# Patient Record
Sex: FEMALE | Race: BLACK OR AFRICAN AMERICAN | Employment: FULL TIME | URBAN - METROPOLITAN AREA
[De-identification: names, ages, dates, MRNs, and addresses within clinical notes are randomized per-mention and may not be internally consistent; named-entity substitution may affect disease eponyms.]

---

## 2019-06-25 PROCEDURE — 87624 HPV HI-RISK TYP POOLED RSLT: CPT | Performed by: OBSTETRICS & GYNECOLOGY

## 2019-06-25 PROCEDURE — 88175 CYTOPATH C/V AUTO FLUID REDO: CPT | Performed by: OBSTETRICS & GYNECOLOGY

## 2019-07-20 ENCOUNTER — HOSPITAL ENCOUNTER (INPATIENT)
Facility: HOSPITAL | Age: 34
LOS: 2 days | Discharge: HOME | DRG: 202 | End: 2019-07-22
Attending: EMERGENCY MEDICINE | Admitting: INTERNAL MEDICINE
Payer: COMMERCIAL

## 2019-07-20 ENCOUNTER — APPOINTMENT (OUTPATIENT)
Dept: GENERAL RADIOLOGY | Age: 34
DRG: 202 | End: 2019-07-20
Attending: EMERGENCY MEDICINE
Payer: COMMERCIAL

## 2019-07-20 DIAGNOSIS — J45.52 SEVERE PERSISTENT ASTHMA WITH STATUS ASTHMATICUS: Primary | ICD-10-CM

## 2019-07-20 LAB
ALBUMIN SERPL-MCNC: 4 G/DL (ref 3.4–5)
ALBUMIN/GLOB SERPL: 1.3 {RATIO} (ref 1–2)
ALP LIVER SERPL-CCNC: 58 U/L (ref 37–98)
ALT SERPL-CCNC: 21 U/L (ref 13–56)
ANION GAP SERPL CALC-SCNC: 8 MMOL/L (ref 0–18)
AST SERPL-CCNC: 18 U/L (ref 15–37)
BASOPHILS # BLD AUTO: 0.16 X10(3) UL (ref 0–0.2)
BASOPHILS NFR BLD AUTO: 1 %
BILIRUB SERPL-MCNC: 0.6 MG/DL (ref 0.1–2)
BILIRUB UR QL STRIP.AUTO: NEGATIVE
BUN BLD-MCNC: 10 MG/DL (ref 7–18)
BUN/CREAT SERPL: 14.3 (ref 10–20)
CALCIUM BLD-MCNC: 9 MG/DL (ref 8.5–10.1)
CHLORIDE SERPL-SCNC: 107 MMOL/L (ref 98–112)
CLARITY UR REFRACT.AUTO: CLEAR
CO2 SERPL-SCNC: 25 MMOL/L (ref 21–32)
COLOR UR AUTO: YELLOW
CREAT BLD-MCNC: 0.7 MG/DL (ref 0.55–1.02)
DEPRECATED RDW RBC AUTO: 43.7 FL (ref 35.1–46.3)
EOSINOPHIL # BLD AUTO: 2.33 X10(3) UL (ref 0–0.7)
EOSINOPHIL NFR BLD AUTO: 14.5 %
ERYTHROCYTE [DISTWIDTH] IN BLOOD BY AUTOMATED COUNT: 13.2 % (ref 11–15)
GLOBULIN PLAS-MCNC: 3.2 G/DL (ref 2.8–4.4)
GLUCOSE BLD-MCNC: 101 MG/DL (ref 70–99)
GLUCOSE UR STRIP.AUTO-MCNC: NEGATIVE MG/DL
HCT VFR BLD AUTO: 43.5 % (ref 35–48)
HGB BLD-MCNC: 14 G/DL (ref 12–16)
IMM GRANULOCYTES # BLD AUTO: 0.06 X10(3) UL (ref 0–1)
IMM GRANULOCYTES NFR BLD: 0.4 %
KETONES UR STRIP.AUTO-MCNC: NEGATIVE MG/DL
LEUKOCYTE ESTERASE UR QL STRIP.AUTO: NEGATIVE
LYMPHOCYTES # BLD AUTO: 2.19 X10(3) UL (ref 1–4)
LYMPHOCYTES NFR BLD AUTO: 13.7 %
M PROTEIN MFR SERPL ELPH: 7.2 G/DL (ref 6.4–8.2)
MCH RBC QN AUTO: 28.9 PG (ref 26–34)
MCHC RBC AUTO-ENTMCNC: 32.2 G/DL (ref 31–37)
MCV RBC AUTO: 89.9 FL (ref 80–100)
MONOCYTES # BLD AUTO: 0.88 X10(3) UL (ref 0.1–1)
MONOCYTES NFR BLD AUTO: 5.5 %
NEUTROPHILS # BLD AUTO: 10.41 X10 (3) UL (ref 1.5–7.7)
NEUTROPHILS # BLD AUTO: 10.41 X10(3) UL (ref 1.5–7.7)
NEUTROPHILS NFR BLD AUTO: 64.9 %
NITRITE UR QL STRIP.AUTO: NEGATIVE
OSMOLALITY SERPL CALC.SUM OF ELEC: 289 MOSM/KG (ref 275–295)
PH UR STRIP.AUTO: 5 [PH] (ref 4.5–8)
PLATELET # BLD AUTO: 212 10(3)UL (ref 150–450)
POTASSIUM SERPL-SCNC: 3.7 MMOL/L (ref 3.5–5.1)
PROT UR STRIP.AUTO-MCNC: NEGATIVE MG/DL
RBC # BLD AUTO: 4.84 X10(6)UL (ref 3.8–5.3)
SODIUM SERPL-SCNC: 140 MMOL/L (ref 136–145)
SP GR UR STRIP.AUTO: 1.01 (ref 1–1.03)
UROBILINOGEN UR STRIP.AUTO-MCNC: <2 MG/DL
WBC # BLD AUTO: 16 X10(3) UL (ref 4–11)

## 2019-07-20 PROCEDURE — 80053 COMPREHEN METABOLIC PANEL: CPT | Performed by: EMERGENCY MEDICINE

## 2019-07-20 PROCEDURE — 94644 CONT INHLJ TX 1ST HOUR: CPT

## 2019-07-20 PROCEDURE — 99285 EMERGENCY DEPT VISIT HI MDM: CPT

## 2019-07-20 PROCEDURE — 71045 X-RAY EXAM CHEST 1 VIEW: CPT | Performed by: EMERGENCY MEDICINE

## 2019-07-20 PROCEDURE — 81001 URINALYSIS AUTO W/SCOPE: CPT | Performed by: EMERGENCY MEDICINE

## 2019-07-20 PROCEDURE — 94645 CONT INHLJ TX EACH ADDL HOUR: CPT

## 2019-07-20 PROCEDURE — 94640 AIRWAY INHALATION TREATMENT: CPT

## 2019-07-20 PROCEDURE — 81025 URINE PREGNANCY TEST: CPT

## 2019-07-20 PROCEDURE — 96361 HYDRATE IV INFUSION ADD-ON: CPT

## 2019-07-20 PROCEDURE — 85025 COMPLETE CBC W/AUTO DIFF WBC: CPT | Performed by: EMERGENCY MEDICINE

## 2019-07-20 PROCEDURE — 5A09357 ASSISTANCE WITH RESPIRATORY VENTILATION, LESS THAN 24 CONSECUTIVE HOURS, CONTINUOUS POSITIVE AIRWAY PRESSURE: ICD-10-PCS | Performed by: HOSPITALIST

## 2019-07-20 PROCEDURE — 87081 CULTURE SCREEN ONLY: CPT | Performed by: INTERNAL MEDICINE

## 2019-07-20 PROCEDURE — 96365 THER/PROPH/DIAG IV INF INIT: CPT

## 2019-07-20 PROCEDURE — 94002 VENT MGMT INPAT INIT DAY: CPT

## 2019-07-20 PROCEDURE — 96375 TX/PRO/DX INJ NEW DRUG ADDON: CPT

## 2019-07-20 RX ORDER — IPRATROPIUM BROMIDE AND ALBUTEROL SULFATE 2.5; .5 MG/3ML; MG/3ML
3 SOLUTION RESPIRATORY (INHALATION)
Status: DISCONTINUED | OUTPATIENT
Start: 2019-07-20 | End: 2019-07-20

## 2019-07-20 RX ORDER — ONDANSETRON 2 MG/ML
4 INJECTION INTRAMUSCULAR; INTRAVENOUS EVERY 6 HOURS PRN
Status: DISCONTINUED | OUTPATIENT
Start: 2019-07-20 | End: 2019-07-22

## 2019-07-20 RX ORDER — METHYLPREDNISOLONE SODIUM SUCCINATE 125 MG/2ML
125 INJECTION, POWDER, LYOPHILIZED, FOR SOLUTION INTRAMUSCULAR; INTRAVENOUS EVERY 8 HOURS
Status: DISCONTINUED | OUTPATIENT
Start: 2019-07-20 | End: 2019-07-21

## 2019-07-20 RX ORDER — ENOXAPARIN SODIUM 100 MG/ML
40 INJECTION SUBCUTANEOUS DAILY
Status: DISCONTINUED | OUTPATIENT
Start: 2019-07-20 | End: 2019-07-22

## 2019-07-20 RX ORDER — METOCLOPRAMIDE HYDROCHLORIDE 5 MG/ML
10 INJECTION INTRAMUSCULAR; INTRAVENOUS EVERY 8 HOURS PRN
Status: DISCONTINUED | OUTPATIENT
Start: 2019-07-20 | End: 2019-07-22

## 2019-07-20 RX ORDER — SODIUM CHLORIDE, SODIUM LACTATE, POTASSIUM CHLORIDE, CALCIUM CHLORIDE 600; 310; 30; 20 MG/100ML; MG/100ML; MG/100ML; MG/100ML
INJECTION, SOLUTION INTRAVENOUS CONTINUOUS
Status: DISCONTINUED | OUTPATIENT
Start: 2019-07-20 | End: 2019-07-21

## 2019-07-20 RX ORDER — ALBUTEROL SULFATE 2.5 MG/3ML
2.5 SOLUTION RESPIRATORY (INHALATION)
Status: DISCONTINUED | OUTPATIENT
Start: 2019-07-20 | End: 2019-07-21

## 2019-07-20 RX ORDER — METHYLPREDNISOLONE SODIUM SUCCINATE 125 MG/2ML
125 INJECTION, POWDER, LYOPHILIZED, FOR SOLUTION INTRAMUSCULAR; INTRAVENOUS ONCE
Status: COMPLETED | OUTPATIENT
Start: 2019-07-20 | End: 2019-07-20

## 2019-07-20 RX ORDER — POTASSIUM CHLORIDE 20 MEQ/1
40 TABLET, EXTENDED RELEASE ORAL ONCE
Status: COMPLETED | OUTPATIENT
Start: 2019-07-20 | End: 2019-07-20

## 2019-07-20 RX ORDER — IPRATROPIUM BROMIDE AND ALBUTEROL SULFATE 2.5; .5 MG/3ML; MG/3ML
SOLUTION RESPIRATORY (INHALATION)
Status: COMPLETED
Start: 2019-07-20 | End: 2019-07-20

## 2019-07-20 RX ORDER — BUDESONIDE 0.5 MG/2ML
0.5 INHALANT ORAL 2 TIMES DAILY
Status: DISCONTINUED | OUTPATIENT
Start: 2019-07-20 | End: 2019-07-22

## 2019-07-20 RX ORDER — MONTELUKAST SODIUM 10 MG/1
10 TABLET ORAL NIGHTLY
Status: DISCONTINUED | OUTPATIENT
Start: 2019-07-20 | End: 2019-07-22

## 2019-07-20 RX ORDER — CETIRIZINE HYDROCHLORIDE 10 MG/1
10 TABLET ORAL DAILY
Status: DISCONTINUED | OUTPATIENT
Start: 2019-07-20 | End: 2019-07-22

## 2019-07-20 RX ORDER — ACETAMINOPHEN 325 MG/1
650 TABLET ORAL EVERY 6 HOURS PRN
Status: DISCONTINUED | OUTPATIENT
Start: 2019-07-20 | End: 2019-07-22

## 2019-07-20 RX ORDER — MAGNESIUM SULFATE 1 G/100ML
1 INJECTION INTRAVENOUS ONCE
Status: COMPLETED | OUTPATIENT
Start: 2019-07-20 | End: 2019-07-20

## 2019-07-20 RX ORDER — ALBUTEROL SULFATE 2.5 MG/3ML
SOLUTION RESPIRATORY (INHALATION)
Status: DISCONTINUED
Start: 2019-07-20 | End: 2019-07-20 | Stop reason: WASHOUT

## 2019-07-20 RX ORDER — FEXOFENADINE HCL 180 MG/1
180 TABLET ORAL DAILY
COMMUNITY

## 2019-07-20 RX ORDER — IPRATROPIUM BROMIDE AND ALBUTEROL SULFATE 2.5; .5 MG/3ML; MG/3ML
3 SOLUTION RESPIRATORY (INHALATION) EVERY 4 HOURS
Status: DISCONTINUED | OUTPATIENT
Start: 2019-07-20 | End: 2019-07-20

## 2019-07-20 RX ORDER — METHYLPREDNISOLONE SODIUM SUCCINATE 40 MG/ML
80 INJECTION, POWDER, LYOPHILIZED, FOR SOLUTION INTRAMUSCULAR; INTRAVENOUS EVERY 8 HOURS
Status: DISCONTINUED | OUTPATIENT
Start: 2019-07-20 | End: 2019-07-20

## 2019-07-20 RX ORDER — IPRATROPIUM BROMIDE AND ALBUTEROL SULFATE 2.5; .5 MG/3ML; MG/3ML
3 SOLUTION RESPIRATORY (INHALATION) EVERY 4 HOURS PRN
Status: DISCONTINUED | OUTPATIENT
Start: 2019-07-20 | End: 2019-07-22

## 2019-07-20 RX ORDER — SODIUM CHLORIDE 9 MG/ML
INJECTION, SOLUTION INTRAVENOUS CONTINUOUS
Status: ACTIVE | OUTPATIENT
Start: 2019-07-20 | End: 2019-07-20

## 2019-07-20 RX ORDER — ONDANSETRON 2 MG/ML
4 INJECTION INTRAMUSCULAR; INTRAVENOUS EVERY 4 HOURS PRN
Status: DISCONTINUED | OUTPATIENT
Start: 2019-07-20 | End: 2019-07-20

## 2019-07-20 NOTE — ED NOTES
Pt presented to ER at 0200 for c/o worsening asthma symptoms, during downtime please see down time charting

## 2019-07-20 NOTE — CONSULTS
Critical Care H&P/Consult     NAME: Milli Portal - ROOM: 6[facility] - MRN: [id number] - Age: 30year old - :  1985    Date of Admission: 2019  3:33 AM  Admission Diagnosis: Severe persistent asthma with status asthmaticus [J45.52]      Assess indicated that her paternal grandmother is alive. She indicated that her paternal grandfather is . She indicated that her paternal aunt is alive. Allergies:No Known Allergies  No current outpatient medications on file.     [COMPLETED] sodium ch atraumatic. Moist oral mucosa   Neck: supple without mass   Lungs: minimal air movment with diffuse wheezing   Chest wall: No tenderness or deformity. Heart: Regular rate and rhythm, normal S1S2, no murmur.    Abdomen: soft, non-tender, non-distended, pos

## 2019-07-20 NOTE — RESPIRATORY THERAPY NOTE
Received pt on Bipap 12/6/35% with 5L Heliox bleeding in, pt's RR 20-24, sats  Around 95%, BS diminished and exp. Wheezing, pt appear no resp. Distress.  Called Dr. Johanna Tang and explain to him 5L Heliox bleeding into Bipap, Heliox will not work for pt, so D

## 2019-07-20 NOTE — H&P
.  CC: asthma exacerbation     PCP: No primary care provider on file. History of Present Illness: Patient is a 30year old female with PMH sig for asthma, typically on allegra and singulair and prn albuterol inhaler.  Has not been taking regular maintena NAD, appears stated age  [de-identified]- NCAT, anicteric sclera, MMM, OP clear  Lymph- no cervical LAD  CV- RRR no murmurs.  No MELISSA  Lungs- CTAB, good respiratory effort  Abd- soft, ntnd, no organomegaly, BS+  Derm- no rashes  MSK- good muscle strength and tone, no

## 2019-07-20 NOTE — ED PROVIDER NOTES
[facility] [place]    History   No chief complaint on file. Stated Complaint: BUZZ    HPI    Patient with asthma has been short of breath over the past 2 days, progressively worsening. No fever chills.   No sputu components:       Result Value    WBC 16.0 (*)     Neutrophil Absolute Prelim 10.41 (*)     Neutrophil Absolute 10.41 (*)     Eosinophil Absolute 2.33 (*)     All other components within normal limits   CBC WITH DIFFERENTIAL WITH PLATELET    Narrative:

## 2019-07-20 NOTE — PLAN OF CARE
Pt received this AM after admit from  ER. A&OX4. O2 89-93% on 4L NC, switched to heliox per RT, not able to maintain sats, placed on bipap w/heliox bleed in, WOB decreased on bipap, wheezing improving slowly.   Sinus tach on monitor, BP stable, afebrile

## 2019-07-20 NOTE — PROGRESS NOTES
07/20/19 1600   BiPAP   $ RT Standby Charge (per 15 min) 1   Device V60   BiPAP / CPAP CE# 6053   Mode Spontaneous/Timed   Interface Full face mask   Mask Size Small   Control Settings   Set Rate 12 breaths/min   Set IPAP 12   Set EPAP 6   Oxygen Percen

## 2019-07-21 LAB
ANION GAP SERPL CALC-SCNC: 6 MMOL/L (ref 0–18)
BASOPHILS # BLD AUTO: 0.03 X10(3) UL (ref 0–0.2)
BASOPHILS NFR BLD AUTO: 0.1 %
BUN BLD-MCNC: 11 MG/DL (ref 7–18)
BUN/CREAT SERPL: 17.7 (ref 10–20)
CALCIUM BLD-MCNC: 9.3 MG/DL (ref 8.5–10.1)
CHLORIDE SERPL-SCNC: 110 MMOL/L (ref 98–112)
CO2 SERPL-SCNC: 26 MMOL/L (ref 21–32)
CREAT BLD-MCNC: 0.62 MG/DL (ref 0.55–1.02)
DEPRECATED RDW RBC AUTO: 44.3 FL (ref 35.1–46.3)
EOSINOPHIL # BLD AUTO: 0.01 X10(3) UL (ref 0–0.7)
EOSINOPHIL NFR BLD AUTO: 0 %
ERYTHROCYTE [DISTWIDTH] IN BLOOD BY AUTOMATED COUNT: 13.4 % (ref 11–15)
GLUCOSE BLD-MCNC: 111 MG/DL (ref 70–99)
HCT VFR BLD AUTO: 39.1 % (ref 35–48)
HGB BLD-MCNC: 12.9 G/DL (ref 12–16)
IMM GRANULOCYTES # BLD AUTO: 0.24 X10(3) UL (ref 0–1)
IMM GRANULOCYTES NFR BLD: 1 %
LYMPHOCYTES # BLD AUTO: 2.34 X10(3) UL (ref 1–4)
LYMPHOCYTES NFR BLD AUTO: 9.5 %
MCH RBC QN AUTO: 29.9 PG (ref 26–34)
MCHC RBC AUTO-ENTMCNC: 33 G/DL (ref 31–37)
MCV RBC AUTO: 90.7 FL (ref 80–100)
MONOCYTES # BLD AUTO: 1.51 X10(3) UL (ref 0.1–1)
MONOCYTES NFR BLD AUTO: 6.1 %
NEUTROPHILS # BLD AUTO: 20.5 X10 (3) UL (ref 1.5–7.7)
NEUTROPHILS # BLD AUTO: 20.5 X10(3) UL (ref 1.5–7.7)
NEUTROPHILS NFR BLD AUTO: 83.3 %
OSMOLALITY SERPL CALC.SUM OF ELEC: 294 MOSM/KG (ref 275–295)
PLATELET # BLD AUTO: 218 10(3)UL (ref 150–450)
POTASSIUM SERPL-SCNC: 4.2 MMOL/L (ref 3.5–5.1)
RBC # BLD AUTO: 4.31 X10(6)UL (ref 3.8–5.3)
SODIUM SERPL-SCNC: 142 MMOL/L (ref 136–145)
WBC # BLD AUTO: 24.6 X10(3) UL (ref 4–11)

## 2019-07-21 PROCEDURE — 80048 BASIC METABOLIC PNL TOTAL CA: CPT | Performed by: INTERNAL MEDICINE

## 2019-07-21 PROCEDURE — 94640 AIRWAY INHALATION TREATMENT: CPT

## 2019-07-21 PROCEDURE — 85025 COMPLETE CBC W/AUTO DIFF WBC: CPT | Performed by: INTERNAL MEDICINE

## 2019-07-21 RX ORDER — METHYLPREDNISOLONE SODIUM SUCCINATE 125 MG/2ML
60 INJECTION, POWDER, LYOPHILIZED, FOR SOLUTION INTRAMUSCULAR; INTRAVENOUS EVERY 8 HOURS
Status: DISCONTINUED | OUTPATIENT
Start: 2019-07-21 | End: 2019-07-22

## 2019-07-21 RX ORDER — ALBUTEROL SULFATE 2.5 MG/3ML
2.5 SOLUTION RESPIRATORY (INHALATION)
Status: DISCONTINUED | OUTPATIENT
Start: 2019-07-21 | End: 2019-07-22

## 2019-07-21 NOTE — RESPIRATORY THERAPY NOTE
Received patient on bipap 12/6 35%. Around midnight, patient requested to have a break from bipap so 6L HFNC was applied and patient has been tolerating very well. Maintaining respiratory rate between 16-22, sats %. Patient currently on 4L HFNC.  Will

## 2019-07-21 NOTE — PROGRESS NOTES
Terrance Lyle Patient Status:  Inpatient    1985 MRN [id number]   [facility] 4SW-A Attending Garo Thakkar MD   Hosp Day # 1 PCP No primary care provider on file. Critical Care Progress Note      Assessment/Plan:  1.  Status ast edema.   Skin: No rashes or lesions.     Recent Labs   Lab 07/21/19  0413   RBC 4.31   HGB 12.9   HCT 39.1   MCV 90.7   MCH 29.9   MCHC 33.0   RDW 13.4   NEPRELIM 20.50*   WBC 24.6*   .0     Recent Labs   Lab 07/20/19  0416 07/21/19  0413   *

## 2019-07-21 NOTE — PLAN OF CARE
Pt received this AM, A&OX4. Breathing feeling better, able to tolerate 4L HFNC, nebs to Q4h. NSR/ST on monitor, BP stable, afebrile. Tolerating regular diet. Up to void in bathroom. IV SL.   Ok to transfer out of ICU this afternoon if she remains stabl

## 2019-07-21 NOTE — PLAN OF CARE
Received alert and oriented x4. On bipap tolerating settings. Switched to hfnc per her request for a break at midnight. Tolerating so far. Up to commode to void. Will continue to monitor.   Problem: CARDIOVASCULAR - ADULT  Goal: Maintains optimal cardiac ou retention  Outcome: Progressing

## 2019-07-22 VITALS
SYSTOLIC BLOOD PRESSURE: 109 MMHG | WEIGHT: 157.19 LBS | BODY MASS INDEX: 24.67 KG/M2 | DIASTOLIC BLOOD PRESSURE: 81 MMHG | HEART RATE: 101 BPM | HEIGHT: 67 IN | TEMPERATURE: 98 F | OXYGEN SATURATION: 97 % | RESPIRATION RATE: 18 BRPM

## 2019-07-22 LAB
ANION GAP SERPL CALC-SCNC: 5 MMOL/L (ref 0–18)
BUN BLD-MCNC: 15 MG/DL (ref 7–18)
BUN/CREAT SERPL: 24.6 (ref 10–20)
CALCIUM BLD-MCNC: 9 MG/DL (ref 8.5–10.1)
CHLORIDE SERPL-SCNC: 108 MMOL/L (ref 98–112)
CO2 SERPL-SCNC: 27 MMOL/L (ref 21–32)
CREAT BLD-MCNC: 0.61 MG/DL (ref 0.55–1.02)
DEPRECATED RDW RBC AUTO: 45.1 FL (ref 35.1–46.3)
ERYTHROCYTE [DISTWIDTH] IN BLOOD BY AUTOMATED COUNT: 13.3 % (ref 11–15)
GLUCOSE BLD-MCNC: 108 MG/DL (ref 70–99)
HCT VFR BLD AUTO: 39.4 % (ref 35–48)
HGB BLD-MCNC: 12.8 G/DL (ref 12–16)
MCH RBC QN AUTO: 29.6 PG (ref 26–34)
MCHC RBC AUTO-ENTMCNC: 32.5 G/DL (ref 31–37)
MCV RBC AUTO: 91 FL (ref 80–100)
OSMOLALITY SERPL CALC.SUM OF ELEC: 291 MOSM/KG (ref 275–295)
PLATELET # BLD AUTO: 218 10(3)UL (ref 150–450)
POTASSIUM SERPL-SCNC: 4.1 MMOL/L (ref 3.5–5.1)
RBC # BLD AUTO: 4.33 X10(6)UL (ref 3.8–5.3)
SODIUM SERPL-SCNC: 140 MMOL/L (ref 136–145)
WBC # BLD AUTO: 20.7 X10(3) UL (ref 4–11)

## 2019-07-22 PROCEDURE — 80048 BASIC METABOLIC PNL TOTAL CA: CPT | Performed by: NURSE PRACTITIONER

## 2019-07-22 PROCEDURE — 94640 AIRWAY INHALATION TREATMENT: CPT

## 2019-07-22 PROCEDURE — 85027 COMPLETE CBC AUTOMATED: CPT | Performed by: NURSE PRACTITIONER

## 2019-07-22 RX ORDER — PREDNISONE 10 MG/1
TABLET ORAL
Qty: 30 TABLET | Refills: 0 | Status: SHIPPED | OUTPATIENT
Start: 2019-07-22 | End: 2020-10-26 | Stop reason: ALTCHOICE

## 2019-07-22 RX ORDER — IPRATROPIUM BROMIDE AND ALBUTEROL SULFATE 2.5; .5 MG/3ML; MG/3ML
3 SOLUTION RESPIRATORY (INHALATION) EVERY 4 HOURS PRN
Qty: 120 ML | Refills: 0 | Status: SHIPPED | OUTPATIENT
Start: 2019-07-22

## 2019-07-22 RX ORDER — PREDNISONE 20 MG/1
40 TABLET ORAL
Status: DISCONTINUED | OUTPATIENT
Start: 2019-07-22 | End: 2019-07-22

## 2019-07-22 RX ORDER — ALBUTEROL SULFATE 2.5 MG/3ML
2.5 SOLUTION RESPIRATORY (INHALATION)
Status: DISCONTINUED | OUTPATIENT
Start: 2019-07-22 | End: 2019-07-22

## 2019-07-22 RX ORDER — BUDESONIDE 0.5 MG/2ML
0.5 INHALANT ORAL 2 TIMES DAILY
Qty: 120 ML | Refills: 0 | Status: SHIPPED | OUTPATIENT
Start: 2019-07-22

## 2019-07-22 NOTE — PAYOR COMM NOTE
--------------  ADMISSION REVIEW     Payor: Yoselin Gil #:  [id number]  Authorization Number: [id number]    Admit date: 7/20/19  Admit time: 4744       Admitting Physician: Desmond Hay MD  Attending Physician:  Carolin Simon MD  Primary Pulse 109   Resp 24   SpO2 96%         Physical Exam  Appears very short of breath. Tripoding. Able to speak in full sentences however. No stridor or drooling. Afebrile and nontoxic in appearance.   Skin: Warm and dry without cyanosis, pallor, rash  HE nebulizer running.     Admission disposition: 7/20/2019  4:39 AM                 Disposition and Plan     Clinical Impression:  Severe persistent asthma with status asthmaticus  (primary encounter diagnosis)    Disposition:  Admit  7/20/2019  4:39 am    Fol (PROAIR HFA) 108 (90 Base) MCG/ACT Inhalation Aero Soln Inhale 2 puffs into the lungs every 6 (six) hours as needed. Disp: 3 Inhaler Rfl: 1   Mometasone Furoate 100 MCG/ACT Inhalation Aerosol Inhale into the lungs.  Disp:  Rfl:    Montelukast Sodium 10 MG O breathing    FINDINGS: Cardiac silhouette and pulmonary vasculature are unremarkable. No consolidation, pleural effusion or pneumothorax. IMPRESSION: Unremarkable portable chest radiograph. Agree with preliminary radiology report from [address] [place] [address] radiology. methylPREDNISolone Sodium Succ (Solu-MEDROL) injection 60 mg     Date Action Dose Route User    7/22/2019 0401 Given 60 mg Intravenous Thang De La Fuente RN    7/21/2019 1842 Given 60 mg Intravenous Otf Bah RN      Montelukast Sodium (Candice Hart Moist oral mucosa                Neck: supple without mass                Lungs: minimal air movment with diffuse wheezing                Chest wall: No tenderness or deformity. Heart: Regular rate and rhythm, normal S1S2, no murmur. General: comfotabe                HEENT: lips, mucosa, tongue normal.                 Lungs: still wheezing but much better air movement                Chest wall: No tenderness or deformity.                 Heart: Regular rate and rhythm, normal S1 rashes or lesions  Neuro: A&OX3, no focal deficits     Data Review:       Labs:           Recent Labs   Lab 07/20/19 0416 07/21/19 0413   WBC 16.0* 24.6*   HGB 14.0 12.9   MCV 89.9 90.7   .0 218.0              Recent Labs   Lab 07/20/19 0416 07/2 sulfate  2.5 mg Nebulization 6 times per day   • methylPREDNISolone Sodium Succ  60 mg Intravenous Q8H   • enoxaparin  40 mg Subcutaneous Daily   • budesonide  0.5 mg Nebulization BID   • cetirizine  10 mg Oral Daily   • Montelukast Sodium  10 mg Oral Nigh

## 2019-07-22 NOTE — PROGRESS NOTES
NURSING TRANSFER NOTE      Patient RECEIVED FROM ICU VIA WHEELCHAIR. SHE IS A&OX4.  DENIES SOB/CHEST PAIN. SATURATING ABOVE 92% ON O2 AT 2L/HFNC.  +NP COUGH. +EXP WHEEZE. DENIES PAIN/DISCOMFORT. Oriented to room. Safety precautions initiated.

## 2019-07-22 NOTE — CM/SW NOTE
07/22/19 1400   CM/SW Screening   Referral Source    Information Source Chart review;Nursing rounds   Patient's Mental Status Alert;Oriented   Patient lives with Alone   Patient Status Prior to Admission   Independent with ADLs and Mobility

## 2019-07-22 NOTE — PROGRESS NOTES
Pulmonary Progress Note        NAME: Terrance Lyle - ROOM: 530530-A - MRN: [id number] - Age: 30year old - : 1985        Last 24hrs: No events overnight, feels better today and hopeful to go home    OBJECTIVE:   19  0404 19  0425  Range Status   07/20/2019 04:16 AM 4.0 3.4 - 5.0 g/dL Final         ASSESSMENT/PLAN:  1.  Status asthmaticus  -much improved  -start oral steroids today  -nebulized steroids  -continue monteleukast  -long term tx is - neb pulmicort and albuterol w/ monteluk

## 2019-07-22 NOTE — PLAN OF CARE
Assumed care of patient around 0730, alert and oriented X4, no c/o CP/SOB, SpO2 94 % on RA  Feeling better overall today  Walked the hallway O2 sats remained above 90% on room air-tolerated walk well    Per Dr. Maritza Ko ok to [place] home    Patient IV dis oxygenation  Description  INTERVENTIONS:  - Assess for changes in respiratory status  - Assess for changes in mentation and behavior  - Position to facilitate oxygenation and minimize respiratory effort  - Oxygen supplementation based on oxygen saturation

## 2019-07-22 NOTE — PLAN OF CARE
Alert and oriented x4. On 2l nc. Reports overall improved breathing. Toelrating nebs q4. Orders to transfer to pmu with continous pulse ox. Report called to [facility] Transferred by wheelchair with all belongings at [phone] in stable condition.   Problem: CAR anxiety  - Monitor for signs/symptoms of CO2 retention  Outcome: Progressing

## 2019-07-22 NOTE — PLAN OF CARE
Problem: Patient/Family Goals  Goal: Patient/Family Long Term Goal  Description  Patient's Long Term Goal:    Interventions:    - See additional Care Plan goals for specific interventions  Outcome: Progressing  Goal: Patient/Family Short Term Goal  Descr Incentive Spirometry  - Assess the need for suctioning and perform as needed  - Assess and instruct to report SOB or any respiratory difficulty  - Respiratory Therapy support as indicated  - Manage/alleviate anxiety  - Monitor for signs/symptoms of CO2 ret

## 2019-07-22 NOTE — RESPIRATORY THERAPY NOTE
Received pt on 2L NC. Her breath sound diminished/wheeze. Continue bronchodilator Neb Tx Q4, pulmicort BID. Peak flow pre/post 170/220. Bipap PRN. Will continue to monitor pt.

## 2019-07-23 NOTE — PAYOR COMM NOTE
--------------  DISCHARGE REVIEW    Payor: Carmenza Jay Jay #:  [id number]  Authorization Number: [id number]    Admit date: 7/20/19  Admit time:  1395  Discharge Date: 7/22/2019  4:24 PM     Admitting Physician: Tawnya Ennis MD  Attending Phys Please refer to prior H&P by Dr. Derick Hernandez on 7/20-- Patient is a 30year old female with PMH sig for asthma, typically on allegra and singulair and prn albuterol inhaler. Has not been taking regular maintenance inhaler. She had worsening sob over past 2 days. Commonly known as:  SINGULAIR  Take 1 tablet (10 mg total) by mouth nightly. * This list has 2 medication(s) that are the same as other medications prescribed for you.  Read the directions carefully, and ask your doctor or other care provider to rev

## 2019-07-23 NOTE — DISCHARGE SUMMARY
[facility] Discharge Summary    Patient ID:  Katerine Neal  [id number]  30year old  1/19/1985    Admit date: 7/20/2019  Discharge date and time: 7/22/19  Attending Physician: Ventura Guillen MD  Primary Care Physician: No primary care provider Neuro: CN Intact, no focal deficits    Discharge meds     Medication List      START taking these medications    budesonide 0.5 MG/2ML Susp  Commonly known as:  PULMICORT  Take 2 mL (0.5 mg total) by nebulization 2 (two) times daily.      ipratropium-albute PROCEDURE:  XR CHEST AP PORTABLE  (CPT=71045)  TECHNIQUE:  AP chest radiograph was obtained. COMPARISON:  None. INDICATIONS:  BUZZ  PATIENT STATED HISTORY: (As transcribed by Technologist)  Patient has a history of asthma.  She is having difficulty breathi

## 2022-03-27 NOTE — PROGRESS NOTES
Melanie  Hospitalist note    PCP: No primary care provider on file. Chief Complaint:  F/u asthma exacerbation    SUBJECTIVE:  Doing well. Sob improving. Was able to walk to bathroom. Eating.      OBJECTIVE:  Temp:  [97 °F (36.1 °C)-98 °F (36.7 °C)] 98 °F ( 1) severe asthma exacerbation with status asthmaticus  - icu/pulm consulted  - to transfer to floor likely  - cont nebs  - IV steroids  - cont singulair from home  - supposed to f/u with allergy as outpt- consider maintenance steroid inhaler and other Weakness

## 2025-03-11 ENCOUNTER — OFFICE VISIT (OUTPATIENT)
Facility: LOCATION | Age: 40
End: 2025-03-11
Payer: COMMERCIAL

## 2025-03-11 DIAGNOSIS — J31.0 CHRONIC RHINITIS: Primary | ICD-10-CM

## 2025-03-11 DIAGNOSIS — H93.13 TINNITUS OF BOTH EARS: ICD-10-CM

## 2025-03-11 PROCEDURE — 99204 OFFICE O/P NEW MOD 45 MIN: CPT | Performed by: OTOLARYNGOLOGY

## 2025-03-11 RX ORDER — FLUTICASONE PROPIONATE 50 MCG
2 SPRAY, SUSPENSION (ML) NASAL DAILY
Qty: 16 G | Refills: 3 | Status: SHIPPED | OUTPATIENT
Start: 2025-03-11

## 2025-03-11 RX ORDER — AZELASTINE 1 MG/ML
2 SPRAY, METERED NASAL 2 TIMES DAILY
Qty: 30 ML | Refills: 3 | Status: SHIPPED | OUTPATIENT
Start: 2025-03-11

## 2025-03-11 NOTE — PROGRESS NOTES
Otolaryngology Consultation Note     Reason for consultation: chronic rhinitis  Consulting physician and service: None     HPI: 39 y/o F presents with h/o chronic nasal congestion for the past several years. [facility]. Currently on Singulair/Allegra as well as an inhaler. Not on any nasal sprays but has used Flonase in the past. Symptoms occur year-round. Underwent allergy testing in the past, positive for a few allergens, considering immunotherapy but has not started yet. Positive clear rhinorrhea, scratchy throat and headaches. Recently notes worsening tinnitus in both ears, intermittent, non-pulsatile. Has h/o exposure to loud noise while in the , no recent audiogram. No hearing loss, otalgia, otorrhea or vertigo. No recent trauma or ear infection. No other complaints.     Past Medical History:   Past Medical History:    Asthma (HCC)        Past Surgical History:   Past Surgical History:   Procedure Laterality Date    Laparoscopy, surg, myomectomy; 5/> intramural myomas &/or total wt >250 gms  05/2021    Laser surgery of eye Bilateral 2013    Removal of ovarian cyst(s) Right 2001    Removal of ovarian cyst(s)  05/2021    Performed outside surgery center.        Medication: Scheduled Meds:  Continuous Infusions:  PRN Meds:.     Allergies:  Allergies[1]  Pertinent Family History:   Family History   Problem Relation Age of Onset    Breast Cancer Paternal Grandmother     Colon Cancer Paternal Grandmother         Pertinent Social History:   Social History     Socioeconomic History    Marital status: Single     Spouse name: Not on file    Number of children: Not on file    Years of education: Not on file    Highest education level: Not on file   Occupational History    Not on file   Tobacco Use    Smoking status: Never    Smokeless tobacco: Never   Vaping Use    Vaping status: Never Used   Substance and Sexual Activity    Alcohol use: Yes     Comment:  social    Drug use: Never    Sexual activity: Yes     Partners: Male   Other Topics Concern    Not on file   Social History Narrative    Not on file     Social Drivers of Health     Food Insecurity: Low Risk  (6/12/2023)    Received from [facility]    Food Insecurity     Have there been times that your food ran out, and you didn't have money to get more?: No     Are there times that you worry that this might happen?: No   Transportation Needs: Low Risk  (6/12/2023)    Received from [facility]    Transportation Needs     Do you have trouble getting transportation to medical appointments?: No     How do you normally get to and from your appointments?: Family/Friend   Stress: Not on file   Housing Stability: Not on file (6/12/2023)        Review of Systems:  Constitutional: Negative.  HENT: see above  Eyes: Negative.  Respiratory: Negative.  Cardiovascular: Negative.  Gastrointestinal: Negative.  Musculoskeletal: Negative.  Skin: Negative.  Renal: Negative  Endocrine: Negative  Psychiatric/Behavioral: Negative.     Physical Examination:  Vitals: not currently breastfeeding.     General: Breathing comfortably on room air while sitting up. Able to communicate verbally. Voice normal. Normal appearing body habitus.     Musculoskeletal: Head: Atraumatic and normocephalic.     Neck: Full ROM and able to extend without issues     Ears: External auditory canals clear with no evidence of significant cerumen or stenosis. Tympanic membranes visible with no evidence of retraction or perforation. No evidence of middle ear effusion bilaterally.     Nose: No sinus tenderness bilaterally upon palpation. No obvious nasal deformity. No masses, rhinorrhea, epistaxis. Nasal septum and mucosa appear normal. Nasal turbinate hypertrophied, R>L     Mouth/Throat: Salivary glands appear normal with no evidence of pain or mass. No masses or lesions noted within the oral mucosa, hard and soft palates,  tongue, tonsils and posterior pharynx. Able to tolerate secretions. Oral cavity and oropharynx widely patent. Tonsils 2 plus and symmetric. Posterior pharyngeal walls appear normal. Thyroid non tender to palpation without evidence of mass or nodules.     Eyes: Extraocular movements intact and pupils equally reactive to light stimulus. No spontaneous or gaze-evoked nystagmus. No proptosis or ecchymosis. VFI.     Lymphatic: No significant cervical lymphadenopathy noted.     Neuro: CN 7 intact with symmetric mobility and strength. No loss of facial sensation.     Skin: Dry, normal turgor, normal color.     Psych: Alert and oriented to person/place/time. Normal affect, amiable     Significant laboratory values: n/a     Imaging: n/a     Procedures: n/a     Assessment/Plan:     Chronic rhinitis: likely allergic. Restart Flonase daily. Recommend Astelin BID. Cont OTC antihistamine and Singulair daily. Cont follow up with Allergist for immunotherapy.  Bilateral tinnitus: recommend masking techniques. Will order audiogram.      Matt Rich MD         [1] No Known Allergies

## 2025-03-13 ENCOUNTER — MED REC SCAN ONLY (OUTPATIENT)
Facility: LOCATION | Age: 40
End: 2025-03-13

## 2025-04-14 ENCOUNTER — OFFICE VISIT (OUTPATIENT)
Facility: LOCATION | Age: 40
End: 2025-04-14
Payer: OTHER GOVERNMENT

## 2025-04-14 DIAGNOSIS — H93.13 TINNITUS OF BOTH EARS: ICD-10-CM

## 2025-04-14 DIAGNOSIS — H93.13 TINNITUS OF BOTH EARS: Primary | ICD-10-CM

## 2025-04-14 DIAGNOSIS — R42 VERTIGO: ICD-10-CM

## 2025-04-14 DIAGNOSIS — J31.0 CHRONIC RHINITIS: Primary | ICD-10-CM

## 2025-04-14 DIAGNOSIS — H93.293 ABNORMAL AUDITORY PERCEPTION OF BOTH EARS: ICD-10-CM

## 2025-04-14 PROCEDURE — 99214 OFFICE O/P EST MOD 30 MIN: CPT | Performed by: OTOLARYNGOLOGY

## 2025-04-14 NOTE — PROGRESS NOTES
Otolaryngology Consultation Note     HPI: 39 y/o F with chronic rhinitis, bilateral tinnitus. Here for follow up. Doing well. Notes improvement in nasal congestion. Taking Flonase daily and Astelin BID. Underwent audiogram today. Cont to have tinnitus, unchanged from last visit. No other new complaints since last visit.      Past Medical History: Past Medical History[1]     Past Surgical History: Past Surgical History[2]     Medication: Scheduled Meds:Scheduled Medications[3]  Continuous Infusions:Medication Infusions[4]  PRN Meds:.PRN Medications[5]     Allergies:  Allergies[6]  Pertinent Family History: Family History[7]     Pertinent Social History:   Social History     Socioeconomic History    Marital status: Single     Spouse name: Not on file    Number of children: Not on file    Years of education: Not on file    Highest education level: Not on file   Occupational History    Not on file   Tobacco Use    Smoking status: Never    Smokeless tobacco: Never   Vaping Use    Vaping status: Never Used   Substance and Sexual Activity    Alcohol use: Yes     Comment: social    Drug use: Never    Sexual activity: Yes     Partners: Male   Other Topics Concern    Not on file   Social History Narrative    Not on file     Social Drivers of Health     Food Insecurity: Low Risk  (6/12/2023)    Received from [facility]    Food Insecurity     Have there been times that your food ran out, and you didn't have money to get more?: No     Are there times that you worry that this might happen?: No   Transportation Needs: Low Risk  (6/12/2023)    Received from [facility]    Transportation Needs     Do you have trouble getting transportation to medical appointments?: No     How do you normally get to and from your appointments?: Family/Friend   Stress: Not on file   Housing Stability: Not on file (6/12/2023)        Review of Systems:  Constitutional: Negative.  HENT: see above  Eyes:  Negative.  Respiratory: Negative.  Cardiovascular: Negative.  Gastrointestinal: Negative.  Musculoskeletal: Negative.  Skin: Negative.  Renal: Negative  Endocrine: Negative  Psychiatric/Behavioral: Negative.     Physical Examination:  Vitals: not currently breastfeeding.     General: Breathing comfortably on room air while sitting up. Able to communicate verbally. Voice normal. Normal appearing body habitus.     Musculoskeletal: Head: Atraumatic and normocephalic.     Neck: Full ROM and able to extend without issues     Ears: External auditory canals clear with no evidence of significant cerumen or stenosis. Tympanic membranes visible with no evidence of retraction or perforation. No evidence of middle ear effusion bilaterally.     Nose: No sinus tenderness bilaterally upon palpation. No obvious nasal deformity. No masses, rhinorrhea, epistaxis. Nasal septum and mucosa appear normal. Nasal turbinate hypertrophied, R>L      Eyes: Extraocular movements intact and pupils equally reactive to light stimulus. No spontaneous or gaze-evoked nystagmus. No proptosis or ecchymosis. VFI.      Neuro: CN 7 intact with symmetric mobility and strength. No loss of facial sensation.     Skin: Dry, normal turgor, normal color.     Psych: Alert and oriented to person/place/time. Normal affect, amiable     Audiogram personally reviewed  My interpretation is as follows    Normal hearing bilaterally  Normal type A tympanograms bilaterally  % bilaterally     Assessment/Plan:      Chronic rhinitis: stable, improved, likely allergic. Cont Flonase daily and Astelin BID. Cont OTC antihistamine and Singulair daily. Cont follow up with Allergist for immunotherapy.  Bilateral tinnitus: stable, normal hearing on audiogram. Recommend masking techniques. Can try OTC Lipoflavonoids.        Matt Rich MD         [1]   Past Medical History:   Asthma (HCC)   [2]   Past Surgical History:  Procedure Laterality Date    Laparoscopy, surg,  myomectomy; 5/> intramural myomas &/or total wt >250 gms  05/2021    Laser surgery of eye Bilateral 2013    Removal of ovarian cyst(s) Right 2001    Removal of ovarian cyst(s)  05/2021    Performed outside surgery center.   [3] [4] [5] [6] No Known Allergies  [7]   Family History  Problem Relation Age of Onset    Breast Cancer Paternal Grandmother     Colon Cancer Paternal Grandmother

## 2025-04-14 NOTE — PROGRESS NOTES
Nir Poe was seen for an audiometric evaluation and tympanogram today. Referred back to physician.    Alina Gonzalez M.A. KIKIA